# Patient Record
Sex: FEMALE | Race: WHITE
[De-identification: names, ages, dates, MRNs, and addresses within clinical notes are randomized per-mention and may not be internally consistent; named-entity substitution may affect disease eponyms.]

---

## 2021-12-27 ENCOUNTER — HOSPITAL ENCOUNTER (EMERGENCY)
Dept: HOSPITAL 61 - ER | Age: 21
LOS: 1 days | Discharge: PSYCHIATRIC OR SUBSTANCE USE FACILITY | End: 2021-12-28
Payer: COMMERCIAL

## 2021-12-27 VITALS — HEIGHT: 63 IN | WEIGHT: 132.28 LBS | BODY MASS INDEX: 23.44 KG/M2

## 2021-12-27 DIAGNOSIS — Z91.51: ICD-10-CM

## 2021-12-27 DIAGNOSIS — R45.851: ICD-10-CM

## 2021-12-27 DIAGNOSIS — Z20.822: ICD-10-CM

## 2021-12-27 DIAGNOSIS — Z3A.01: ICD-10-CM

## 2021-12-27 DIAGNOSIS — O26.891: Primary | ICD-10-CM

## 2021-12-27 LAB
ACETAMIN: < 2 MCG/ML (ref 10–30)
AMPHETAMINE/METHAMPHETAMINE: (no result)
ANION GAP SERPL CALC-SCNC: 7 MMOL/L (ref 6–14)
BARBITURATES UR-MCNC: (no result) UG/ML
BASOPHILS # BLD AUTO: 0 X10^3/UL (ref 0–0.2)
BASOPHILS NFR BLD: 0 % (ref 0–3)
BENZODIAZ UR-MCNC: (no result) UG/L
BUN SERPL-MCNC: 9 MG/DL (ref 7–20)
CALCIUM SERPL-MCNC: 8.1 MG/DL (ref 8.5–10.1)
CANNABINOIDS UR-MCNC: (no result) UG/L
CHLORIDE SERPL-SCNC: 103 MMOL/L (ref 98–107)
CO2 SERPL-SCNC: 27 MMOL/L (ref 21–32)
COCAINE UR-MCNC: (no result) NG/ML
CREAT SERPL-MCNC: 0.5 MG/DL (ref 0.6–1)
EOSINOPHIL NFR BLD: 0 % (ref 0–3)
EOSINOPHIL NFR BLD: 0 X10^3/UL (ref 0–0.7)
ERYTHROCYTE [DISTWIDTH] IN BLOOD BY AUTOMATED COUNT: 13.7 % (ref 11.5–14.5)
ETHANOL SERPL-MCNC: < 10 MG/DL (ref 0–10)
GFR SERPLBLD BASED ON 1.73 SQ M-ARVRAT: 157.3 ML/MIN
GLUCOSE SERPL-MCNC: 84 MG/DL (ref 70–99)
HCT VFR BLD CALC: 44.6 % (ref 36–47)
HGB BLD-MCNC: 14.3 G/DL (ref 12–15.5)
LYMPHOCYTES # BLD: 1.9 X10^3/UL (ref 1–4.8)
LYMPHOCYTES NFR BLD AUTO: 17 % (ref 24–48)
MCH RBC QN AUTO: 29 PG (ref 25–35)
MCHC RBC AUTO-ENTMCNC: 32 G/DL (ref 31–37)
MCV RBC AUTO: 90 FL (ref 79–100)
METHADONE SERPL-MCNC: (no result) NG/ML
MONO #: 0.7 X10^3/UL (ref 0–1.1)
MONOCYTES NFR BLD: 6 % (ref 0–9)
NEUT #: 8.6 X10^3/UL (ref 1.8–7.7)
NEUTROPHILS NFR BLD AUTO: 77 % (ref 31–73)
OPIATES UR-MCNC: (no result) NG/ML
PCP SERPL-MCNC: (no result) MG/DL
PLATELET # BLD AUTO: 230 X10^3/UL (ref 140–400)
POTASSIUM SERPL-SCNC: 3.8 MMOL/L (ref 3.5–5.1)
RBC # BLD AUTO: 4.96 X10^6/UL (ref 3.5–5.4)
SALIC: 0.7 MG/DL (ref 2.8–20)
SODIUM SERPL-SCNC: 137 MMOL/L (ref 136–145)
WBC # BLD AUTO: 11.3 X10^3/UL (ref 4–11)

## 2021-12-27 PROCEDURE — U0003 INFECTIOUS AGENT DETECTION BY NUCLEIC ACID (DNA OR RNA); SEVERE ACUTE RESPIRATORY SYNDROME CORONAVIRUS 2 (SARS-COV-2) (CORONAVIRUS DISEASE [COVID-19]), AMPLIFIED PROBE TECHNIQUE, MAKING USE OF HIGH THROUGHPUT TECHNOLOGIES AS DESCRIBED BY CMS-2020-01-R: HCPCS

## 2021-12-27 PROCEDURE — 86900 BLOOD TYPING SEROLOGIC ABO: CPT

## 2021-12-27 PROCEDURE — 80048 BASIC METABOLIC PNL TOTAL CA: CPT

## 2021-12-27 PROCEDURE — 86901 BLOOD TYPING SEROLOGIC RH(D): CPT

## 2021-12-27 PROCEDURE — 87426 SARSCOV CORONAVIRUS AG IA: CPT

## 2021-12-27 PROCEDURE — 76801 OB US < 14 WKS SINGLE FETUS: CPT

## 2021-12-27 PROCEDURE — 80307 DRUG TEST PRSMV CHEM ANLYZR: CPT

## 2021-12-27 PROCEDURE — 84702 CHORIONIC GONADOTROPIN TEST: CPT

## 2021-12-27 PROCEDURE — 85025 COMPLETE CBC W/AUTO DIFF WBC: CPT

## 2021-12-27 PROCEDURE — 81025 URINE PREGNANCY TEST: CPT

## 2021-12-27 PROCEDURE — 99285 EMERGENCY DEPT VISIT HI MDM: CPT

## 2021-12-27 PROCEDURE — G0480 DRUG TEST DEF 1-7 CLASSES: HCPCS

## 2021-12-27 PROCEDURE — 86850 RBC ANTIBODY SCREEN: CPT

## 2021-12-27 PROCEDURE — 36415 COLL VENOUS BLD VENIPUNCTURE: CPT

## 2021-12-27 PROCEDURE — 80329 ANALGESICS NON-OPIOID 1 OR 2: CPT

## 2021-12-27 NOTE — PHYS DOC
General Adult


EDM:


Chief Complaint:  SUICDAL IDEATION





HPI:


HPI:





Patient is a 21  year old female who presents with today she attempted suicide 

by putting a plastic bag over her head.  She states that her parents found her. 

She states that she did not pass out but was feeling very nauseated.  She states

her last menstrual period was 2 months ago when she is taken 2 pregnancy test 

and they were positive.  She states that she is no longer with the father of the

baby but the family has been there more for her lately then her parents are.  

She states her parents for the last 20 years have always treated her like an 

infant.  She states that she does not think that living would do anybody any 

good.  She states that everyone is out to hurt her or just drop her like she is 

nothing.  She states that she "hates it here and does not want to be in this 

world anymore."  She states that she did do some superficial cuts on her wrist 

bilaterally yesterday.  She states her vaccines are all up-to-date.  She denies 

taking any medications to overdose or any alcohol use.  She states that she is 

taking Zoloft 20 mg.  She states that she is seeing a new therapist at the 

[facility] by K[facility] but does not know the name of the facility.  She states she 

does not much care for that therapist.  He has a history of many suicidal 

attempts such as overdose, cutting.  She states " it must be impossible for me 

to die because I am not doing something right".  Denies any pain at this time.  

Denies chest pain, shortness of breath, dizziness, syncope, headache, abdominal 

pain, nausea, vomiting, diarrhea, vaginal bleeding, back pain, urinary symptoms,

numbness or tingling or throat pain.





Review of Systems:


Review of Systems:


Constitutional:   Denies fever or chills. []


Eyes:   Denies change in visual acuity. []


HENT:   Denies nasal congestion or sore throat. [] 


Respiratory:   Denies cough or shortness of breath. [] 


Cardiovascular:   Denies chest pain or edema. [] 


GI:   Denies abdominal pain, nausea, vomiting, bloody stools or diarrhea. [] 


:  Denies dysuria. [] 


Musculoskeletal:   Denies back pain or joint pain. [] 


Integument:   Denies rash. [] 


Neurologic:   Denies headache, focal weakness or sensory changes. [] 


Endocrine:   Denies polyuria or polydipsia. [] 


Lymphatic:  Denies swollen glands. [] 


Psychiatric: + depression or +anxiety. + Suicidal attempt, + suicide ideation []





Heart Score:


C/O Chest Pain:  No





Physical Exam:


PE:





Constitutional: Well developed, well nourished, no acute distress, non-toxic 

appearance. []


HENT: Normocephalic, atraumatic, bilateral external ears normal, oropharynx 

moist, no oral exudates, nose normal. []


Eyes: PERRLA, EOMI, conjunctiva normal, no discharge. [] 


Neck: Normal range of motion, no tenderness, supple, no stridor. [] 


Cardiovascular:Heart rate regular rhythm, no murmur []


Lungs & Thorax:  Bilateral breath sounds clear to auscultation []


Abdomen: Bowel sounds normal, soft, no tenderness, no masses, no pulsatile 

masses. [] 


Skin: Warm, dry, no erythema, no rash. [] 


Back: No tenderness, no CVA tenderness. [] 


Extremities: No tenderness, no cyanosis, no clubbing, ROM intact, no edema. [] 


Neurologic: Alert and oriented X 3, normal motor function, normal sensory 

function, no focal deficits noted. []


Psychologic: Affect normal, judgement normal, mood normal.  Depressed.  Crying. 

 []





EKG:


EKG:


[]





Radiology/Procedures:


Radiology/Procedures:


[]


Impression:


                            [facility]


                    [address]  [place], [place] [id number] [phone]


                                        


                                 IMAGING REPORT





                                     Signed





PATIENT: ROSELINE SCHILLING AACCOUNT: [id number]     MRN#: [id number]


: 2000           LOCATION: ER              AGE: 21


SEX: F                    EXAM DT: 21         ACCESSION#: [id number]


STATUS: REG ER            ORD. PHYSICIAN: ELISE CANNON


REASON: positive pregnancy, tried to suffocate self today


PROCEDURE: OB < 14 WKS





Exam: Ultrasound OB less than 14 week





Indication: Positive pregnancy test





Technique: Real-time grayscale and color Doppler images of the pelvis were 

obtained by the department sonographer.





Comparisons: None





FINDINGS:


Uterus measures 7.3 x 5.5 x 3.6 cm. Within the endometrium there is a 

gestational sac with internal yolk sac and fetal pole. Fetal heart rate is 

measured at 113 bpm. Crown-rump length measured at 0.66 cm corresponding to 6 

weeks 4 days gestation.





Right ovary measures 3.1 x 2.0 x 1.5 cm.





Left ovary measures 2.7 x 2.5 x 1.9 cm.





Vascular flow identified in the ovaries bilaterally.





No free fluid identified and pelvis. 





IMPRESSION:


1.  Single live intrauterine gestation measuring 6 weeks 4 days by current 

ultrasound.


2.  Dedicated fetal survey is recommended at 18-20 weeks gestation.





Electronically signed by: David Fernandez MD (2021 9:28 PM) [facility]














DICTATED and SIGNED BY:     DAVID FERNANDEZ MD


DATE:     214MTH0 0





Course & Med Decision Making:


Course & Med Decision Making


Pertinent Labs and Imaging studies reviewed. (See chart for details)





See HPI.  Patient is on one-to-one observation.  Speaks in full clear sentences.

  Ambulatory with a steady gait.  Skin pink warm and dry.  Small superficial 

cuts to wrist bilaterally.  No bleeding.  No marks on her throat or bruising.  

Lungs are clear to all station all lobes.  Vital signs within normal limits.  

PERRLA.  Alert and oriented x4.





Patient is medically cleared.  I have talked to Eden French from PAT team. She

 is on her way to see the patient.





Patient denied any suicidal thoughts or suicidal ideation to PAT team.  Eden 

with PAT team states that the patient has a safe place to go and the patient can

 be discharged to a friend's house that patient met at [facility].  I 

feel that this is unsafe as the patient tried to kill herself with a bag over 

her head today and was found by her parents.  Patient's parents stated that her 

dad walked into the patient's room and saw her laying in bed but the blankets 

were pulled up over her head and he thought it was some kind of a " beauty 

thing".  He then went downstairs and was picking up trash and talk to the 

patient's mother who states there is no such beauty thing like that.  They then 

went back upstairs and pulled down the covers and found her laying in bed with a

 plastic bag over her head trying to kill herself.  Me and Dr. Monroe both agree

 that the patient is not safe to go home or be discharged.  Patient is going to 

be admitted to a in patient facility for stabilization.  [facility] does

 not have a bed at this time and they may have a bed in the morning.  We are 

still awaiting the patient's rapid Covid result.  Eden states that she is 

sending the patient's paperwork to [facility] so that they have it.





0100: Patient signed over to Dr. Monroe.





[]





Melissa Disclaimer:


Dragon Disclaimer:


This electronic medical record was generated, in whole or in part, using a voice

 recognition dictation system.





Departure


Departure


Impression:  


   Primary Impression:  


   Suicide attempt


   Additional Impression:  


   Suicidal ideation











ELISE CANNON APRN            Dec 27, 2021 18:32

## 2021-12-27 NOTE — RAD
Exam: Ultrasound OB less than 14 week



Indication: Positive pregnancy test



Technique: Real-time grayscale and color Doppler images of the pelvis were obtained by the department
 sonographer.



Comparisons: None



FINDINGS:

Uterus measures 7.3 x 5.5 x 3.6 cm. Within the endometrium there is a gestational sac with internal y
olk sac and fetal pole. Fetal heart rate is measured at 113 bpm. Crown-rump length measured at 0.66 c
m corresponding to 6 weeks 4 days gestation.



Right ovary measures 3.1 x 2.0 x 1.5 cm.



Left ovary measures 2.7 x 2.5 x 1.9 cm.



Vascular flow identified in the ovaries bilaterally.



No free fluid identified and pelvis. 



IMPRESSION:

1.  Single live intrauterine gestation measuring 6 weeks 4 days by current ultrasound.

2.  Dedicated fetal survey is recommended at 18-20 weeks gestation.



Electronically signed by: David Tovar MD (12/27/2021 9:28 PM) [facility]

## 2021-12-28 VITALS — SYSTOLIC BLOOD PRESSURE: 110 MMHG | DIASTOLIC BLOOD PRESSURE: 56 MMHG
